# Patient Record
Sex: FEMALE | Race: AMERICAN INDIAN OR ALASKA NATIVE
[De-identification: names, ages, dates, MRNs, and addresses within clinical notes are randomized per-mention and may not be internally consistent; named-entity substitution may affect disease eponyms.]

---

## 2020-10-01 ENCOUNTER — HOSPITAL ENCOUNTER (EMERGENCY)
Dept: HOSPITAL 5 - ED | Age: 30
Discharge: HOME | End: 2020-10-01
Payer: SELF-PAY

## 2020-10-01 VITALS — SYSTOLIC BLOOD PRESSURE: 143 MMHG | DIASTOLIC BLOOD PRESSURE: 68 MMHG

## 2020-10-01 DIAGNOSIS — D25.9: Primary | ICD-10-CM

## 2020-10-01 DIAGNOSIS — Z79.899: ICD-10-CM

## 2020-10-01 DIAGNOSIS — R11.2: ICD-10-CM

## 2020-10-01 DIAGNOSIS — E11.9: ICD-10-CM

## 2020-10-01 DIAGNOSIS — N39.0: ICD-10-CM

## 2020-10-01 DIAGNOSIS — F17.200: ICD-10-CM

## 2020-10-01 LAB
ALBUMIN SERPL-MCNC: 3.7 G/DL (ref 3.9–5)
ALT SERPL-CCNC: 18 UNITS/L (ref 7–56)
BACTERIA #/AREA URNS HPF: (no result) /HPF
BASOPHILS # (AUTO): 0.1 K/MM3 (ref 0–0.1)
BASOPHILS NFR BLD AUTO: 0.5 % (ref 0–1.8)
BILIRUB UR QL STRIP: (no result)
BLOOD UR QL VISUAL: (no result)
BUN SERPL-MCNC: 7 MG/DL (ref 7–17)
BUN/CREAT SERPL: 10 %
CALCIUM SERPL-MCNC: 9.4 MG/DL (ref 8.4–10.2)
EOSINOPHIL # BLD AUTO: 0 K/MM3 (ref 0–0.4)
EOSINOPHIL NFR BLD AUTO: 0.2 % (ref 0–4.3)
HCT VFR BLD CALC: 34.3 % (ref 30.3–42.9)
HEMOLYSIS INDEX: 2
HGB BLD-MCNC: 10.9 GM/DL (ref 10.1–14.3)
LYMPHOCYTES # BLD AUTO: 1.3 K/MM3 (ref 1.2–5.4)
LYMPHOCYTES NFR BLD AUTO: 8.4 % (ref 13.4–35)
MCHC RBC AUTO-ENTMCNC: 32 % (ref 30–34)
MCV RBC AUTO: 76 FL (ref 79–97)
MONOCYTES # (AUTO): 1 K/MM3 (ref 0–0.8)
MONOCYTES % (AUTO): 6.1 % (ref 0–7.3)
MUCOUS THREADS #/AREA URNS HPF: (no result) /HPF
PH UR STRIP: 6 [PH] (ref 5–7)
PLATELET # BLD: 452 K/MM3 (ref 140–440)
RBC # BLD AUTO: 4.54 M/MM3 (ref 3.65–5.03)
RBC #/AREA URNS HPF: 5 /HPF (ref 0–6)
UROBILINOGEN UR-MCNC: < 2 MG/DL (ref ?–2)
WBC #/AREA URNS HPF: 104 /HPF (ref 0–6)

## 2020-10-01 PROCEDURE — 74177 CT ABD & PELVIS W/CONTRAST: CPT

## 2020-10-01 PROCEDURE — 81001 URINALYSIS AUTO W/SCOPE: CPT

## 2020-10-01 PROCEDURE — 85025 COMPLETE CBC W/AUTO DIFF WBC: CPT

## 2020-10-01 PROCEDURE — 96375 TX/PRO/DX INJ NEW DRUG ADDON: CPT

## 2020-10-01 PROCEDURE — 83690 ASSAY OF LIPASE: CPT

## 2020-10-01 PROCEDURE — 80053 COMPREHEN METABOLIC PANEL: CPT

## 2020-10-01 PROCEDURE — 76856 US EXAM PELVIC COMPLETE: CPT

## 2020-10-01 PROCEDURE — 36415 COLL VENOUS BLD VENIPUNCTURE: CPT

## 2020-10-01 PROCEDURE — 84703 CHORIONIC GONADOTROPIN ASSAY: CPT

## 2020-10-01 PROCEDURE — 82962 GLUCOSE BLOOD TEST: CPT

## 2020-10-01 PROCEDURE — 82805 BLOOD GASES W/O2 SATURATION: CPT

## 2020-10-01 PROCEDURE — 76830 TRANSVAGINAL US NON-OB: CPT

## 2020-10-01 PROCEDURE — 96374 THER/PROPH/DIAG INJ IV PUSH: CPT

## 2020-10-01 PROCEDURE — 99284 EMERGENCY DEPT VISIT MOD MDM: CPT

## 2020-10-01 PROCEDURE — 96361 HYDRATE IV INFUSION ADD-ON: CPT

## 2020-10-01 NOTE — ULTRASOUND REPORT
PELVIC ULTRASOUND



INDICATION: Right adnexal mass on CT, lower abdominal pain, nausea, vomiting



COMPARISON: CT abdomen and pelvis today



TECHNIQUE: Transabdominal and endovaginal



FINDINGS: Uterus measures 6.7 x 4.2 x 5.2 cm. Endometrial stripe measures 12 mm. No fluid is seen wit
hin the endometrial canal. No uterine abnormalities are noted.



Right ovary measures 3.5 cm in length and shows small follicular-type cysts. Left ovary measures 3.4 
cm in length and shows no focal abnormalities. Flow is noted in both ovaries.



Based minimal amount of free fluid is seen in the cul-de-sac which is a nonspecific finding.



IMPRESSION: No significant abnormalities are seen. The solid-appearing adnexal mass on the right on C
T is not detected. Clearly this is present by CT in given the normal appearance of the ovaries I susp
ect this is either a large endometrioma or possibly an unusually large pedunculated leiomyoma though 
given the size and shape this is not favored. Certainly I would at least suggest CT follow-up.



Signer Name: Bryant Smith MD 

Signed: 10/1/2020 8:16 PM

Workstation Name: [id number]

## 2020-10-01 NOTE — CAT SCAN REPORT
CT abdomen pelvis w con



INDICATION:

Lower abdominal pain nausea vomiting.



TECHNIQUE:

All CT scans at this location are performed using CT dose reduction for ALARA by means of automated e
xposure control. 



COMPARISON:

None available.



FINDINGS:

Lung bases are clear of acute disease. Liver, gallbladder, spleen, pancreas, kidneys and adrenals are
 negative. Abdominal aorta is normal in size. No adenopathy.



Pelvis



Normal appendix. The uterus is deviated to the left by what appears to be a large (8.2 cm x 5.9 cm x 
9.1 cm) right adnexal mass. Urinary bladder is negative although somewhat compressed by the mass. Lef
t ovary appears unremarkable.



No skeletal lesions.



IMPRESSION:

1. 9 cm maximum diameter solid appearing right adnexal mass. Suggest ultrasound as a first step in fu
rther evaluation. 



Signer Name: Buck Roe MD 

Signed: 10/1/2020 4:28 PM

Workstation Name: [id number]

## 2020-10-01 NOTE — EMERGENCY DEPARTMENT REPORT
HPI





- HPI


HPI: 





Room 6








The patient is a 29-year-old female present with a chief complaint of abdominal 

pain nausea vomiting.  Patient states she developed right-sided abdominal pain 3

days ago described as aching in nature.  Patient states the pain is been 

intermittent and does not change with meals.  Patient admits to nausea vomiting 

and body aches.  Patient states last week she had dysuria but none now.  Patient

denies diarrhea or cough or known contact with COVID patients.  Patient admits 

to subjective fever.





<UDAY ROWAN - Last Filed: 10/01/20 16:39>





<AARON MONROE III - Last Filed: 10/01/20 21:46>





- General


Chief Complaint: Hyperglycemia


Time Seen by Provider: 10/01/20 13:36





ED Past Medical Hx





- Past Medical History


Hx Diabetes: Yes





- Surgical History


Additional Surgical History: BL leg, L knee





- Family History


Family history: no significant





- Social History


Smoking Status: Current Every Day Smoker (1 pack/day)


Substance Use Type: None (Denies illicit drug use)





<UDAY ROWAN - Last Filed: 10/01/20 16:39>





<AARON MONROE III - Last Filed: 10/01/20 21:46>





- Medications


Home Medications: 


                                Home Medications











 Medication  Instructions  Recorded  Confirmed  Last Taken  Type


 


Ondansetron [Zofran Odt] 4 mg PO Q6HR PRN #20 tab.rapdis 10/01/20  Unknown Rx


 


levoFLOXacin [Levaquin TAB] 500 mg PO QDAY 10 Days #10 tablet 10/01/20  Unknown 

Rx














ED Review of Systems


ROS: 


Stated complaint: SUGAR HIGH


Other details as noted in HPI





Constitutional: fever (Subjective)


Respiratory: no symptoms reported


Endocrine: no symptoms reported


Gastrointestinal: abdominal pain, nausea, vomiting.  denies: diarrhea


Genitourinary: dysuria





<UDAY ROWAN - Last Filed: 10/01/20 16:39>


ROS: 


Stated complaint: SUGAR HIGH


Other details as noted in HPI








<AARON MONROE III - Last Filed: 10/01/20 21:46>





Physical Exam





- Physical Exam


Vital Signs: 


                                   Vital Signs











  10/01/20 10/01/20 10/01/20





  12:28 13:35 13:45


 


Temperature 99.6 F  


 


Pulse Rate 120 H 98 H 102 H


 


Respiratory 18 20 37 H





Rate   


 


Blood Pressure 143/87  135/76


 


Blood Pressure  131/97 





[Left]   


 


O2 Sat by Pulse 99 99 99





Oximetry   











Physical Exam: 





GENERAL: The patient is well-developed well-nourished female lying on stretcher 

sleeping not appearing to be in acute distress. []


HEENT: Normocephalic.  Atraumatic.  Extraocular motions are intact.  Patient has

 moist mucous membranes.


NECK: Supple.  Trachea midline


CHEST/LUNGS: Clear to auscultation.  There is no respiratory distress noted.


HEART/CARDIOVASCULAR: Regular.  There is no tachycardia.  There is no gallop rub

 or murmur.


ABDOMEN: Abdomen is soft, with tenderness to palpation in the suprapubic region.

  Patient has normal bowel sounds.  There is no abdominal distention.


SKIN: There is no rash.  There is no edema.  There is no diaphoresis.


NEURO: The patient is awake, alert, and oriented.  The patient is cooperative. 

The patient has normal speech


MUSCULOSKELETAL: There is no CVA tenderness.  There is no evidence of acute 

injury.





<UDAY ROWAN K - Last Filed: 10/01/20 16:39>





- Physical Exam


Vital Signs: 


                                   Vital Signs











  10/01/20 10/01/20 10/01/20





  12:28 13:35 13:45


 


Temperature 99.6 F  


 


Pulse Rate 120 H 98 H 102 H


 


Respiratory 18 20 37 H





Rate   


 


Blood Pressure 143/87  135/76


 


Blood Pressure  131/97 





[Left]   


 


O2 Sat by Pulse 99 99 99





Oximetry   














  10/01/20 10/01/20





  14:45 15:31


 


Temperature  


 


Pulse Rate 98 H 88


 


Respiratory 33 H 24





Rate  


 


Blood Pressure 123/62 123/62


 


Blood Pressure  





[Left]  


 


O2 Sat by Pulse 100 99





Oximetry  














<AARON MONROE III K - Last Filed: 10/01/20 21:46>





ED Course


                                   Vital Signs











  10/01/20 10/01/20 10/01/20





  12:28 13:35 13:45


 


Temperature 99.6 F  


 


Pulse Rate 120 H 98 H 102 H


 


Respiratory 18 20 37 H





Rate   


 


Blood Pressure 143/87  135/76


 


Blood Pressure  131/97 





[Left]   


 


O2 Sat by Pulse 99 99 99





Oximetry   














<UDAY ROWAN K - Last Filed: 10/01/20 16:39>


                                   Vital Signs











  10/01/20 10/01/20 10/01/20





  12:28 13:35 13:45


 


Temperature 99.6 F  


 


Pulse Rate 120 H 98 H 102 H


 


Respiratory 18 20 37 H





Rate   


 


Blood Pressure 143/87  135/76


 


Blood Pressure  131/97 





[Left]   


 


O2 Sat by Pulse 99 99 99





Oximetry   














  10/01/20 10/01/20





  14:45 15:31


 


Temperature  


 


Pulse Rate 98 H 88


 


Respiratory 33 H 24





Rate  


 


Blood Pressure 123/62 123/62


 


Blood Pressure  





[Left]  


 


O2 Sat by Pulse 100 99





Oximetry  














- Reevaluation(s)


Reevaluation #1: 


Patient was signed out to me from previous physician at 8 PM.





Patient states that her pain is resolved.  Patient states her nausea has 

resolved.





I discussed all results and clinical findings with patient.  I discussed plan of

 care with patient.  Patient agrees with plan of care.  Patient is stable for 

discharge.  Patient will be discharged home.  Patient given discharge 

instructions.  Patient voiced understanding of discharge instructions.


10/01/20 21:41








<AARON MONROE III - Last Filed: 10/01/20 21:46>





ED Medical Decision Making





- Lab Data


Result diagrams: 


                                 10/01/20 12:39





                                 10/01/20 12:39





                                Laboratory Tests











  10/01/20 10/01/20 10/01/20





  12:39 12:39 12:39


 


WBC  15.9 H  


 


RBC  4.54  


 


Hgb  10.9  


 


Hct  34.3  


 


MCV  76 L  


 


MCH  24 L  


 


MCHC  32  


 


RDW  15.6 H  


 


Plt Count  452 H  


 


Lymph % (Auto)  8.4 L  


 


Mono % (Auto)  6.1  


 


Eos % (Auto)  0.2  


 


Baso % (Auto)  0.5  


 


Lymph # (Auto)  1.3  


 


Mono # (Auto)  1.0 H  


 


Eos # (Auto)  0.0  


 


Baso # (Auto)  0.1  


 


Seg Neutrophils %  84.8 H  


 


Seg Neutrophils #  13.5 H  


 


VBG pH   


 


Sodium   132 L 


 


Potassium   3.8 


 


Chloride   93.9 L 


 


Carbon Dioxide   25 


 


Anion Gap   17 


 


BUN   7 


 


Creatinine   0.7 


 


Estimated GFR   > 60 


 


BUN/Creatinine Ratio   10 


 


Glucose   323 H 


 


POC Glucose   


 


Calcium   9.4 


 


Total Bilirubin   0.30 


 


AST   15 


 


ALT   18 


 


Alkaline Phosphatase   118 


 


Total Protein   8.0 


 


Albumin   3.7 L 


 


Albumin/Globulin Ratio   0.9 


 


Lipase   28 


 


HCG, Qual    Negative


 


Urine Color   


 


Urine Turbidity   


 


Urine pH   


 


Ur Specific Gravity   


 


Urine Protein   


 


Urine Glucose (UA)   


 


Urine Ketones   


 


Urine Blood   


 


Urine Nitrite   


 


Urine Bilirubin   


 


Urine Urobilinogen   


 


Ur Leukocyte Esterase   


 


Urine WBC (Auto)   


 


Urine RBC (Auto)   


 


U Epithel Cells (Auto)   


 


Urine Bacteria (Auto)   


 


Urine Mucus   














  10/01/20 10/01/20 10/01/20





  12:42 12:46 15:47


 


WBC   


 


RBC   


 


Hgb   


 


Hct   


 


MCV   


 


MCH   


 


MCHC   


 


RDW   


 


Plt Count   


 


Lymph % (Auto)   


 


Mono % (Auto)   


 


Eos % (Auto)   


 


Baso % (Auto)   


 


Lymph # (Auto)   


 


Mono # (Auto)   


 


Eos # (Auto)   


 


Baso # (Auto)   


 


Seg Neutrophils %   


 


Seg Neutrophils #   


 


VBG pH   7.337 


 


Sodium   


 


Potassium   


 


Chloride   


 


Carbon Dioxide   


 


Anion Gap   


 


BUN   


 


Creatinine   


 


Estimated GFR   


 


BUN/Creatinine Ratio   


 


Glucose   


 


POC Glucose  302 H  


 


Calcium   


 


Total Bilirubin   


 


AST   


 


ALT   


 


Alkaline Phosphatase   


 


Total Protein   


 


Albumin   


 


Albumin/Globulin Ratio   


 


Lipase   


 


HCG, Qual   


 


Urine Color    Straw


 


Urine Turbidity    Clear


 


Urine pH    6.0


 


Ur Specific Gravity    1.051 H


 


Urine Protein    30 mg/dl


 


Urine Glucose (UA)    >=500


 


Urine Ketones    20


 


Urine Blood    Neg


 


Urine Nitrite    Neg


 


Urine Bilirubin    Neg


 


Urine Urobilinogen    < 2.0


 


Ur Leukocyte Esterase    Lg


 


Urine WBC (Auto)    104.0 H


 


Urine RBC (Auto)    5.0


 


U Epithel Cells (Auto)    3.0


 


Urine Bacteria (Auto)    1+


 


Urine Mucus    Few














- Radiology Data


Radiology results: report reviewed (CT abdomen pelvis), image reviewed (CT 

abdomen pelvis)





[facility] [address] [place] [address] Cat

 Scan Report Signed Patient: VANESSA GARLAND MR#: [id number] : 1990 

Acct:[id number] Age/Sex: 29 / F ADM Date: 10/01/20 Loc: ED Attending Dr: 

Ordering Physician: UDAY ROWAN MD Date of Service: 10/01/20 Procedure(s): CT 

abdomen pelvis w con Accession Number(s): [id number] cc: UDAY ROWAN MD CT abdomen

 pelvis w con INDICATION: Lower abdominal pain nausea vomiting. TECHNIQUE: All 

CT scans at this location are performed using CT dose reduction for ALARA by 

means of automated exposure control. COMPARISON: None available. FINDINGS: Lung 

bases are clear of acute disease. Liver, gallbladder, spleen, pancreas, kidneys 

and adrenals are negative. Abdominal aorta is normal in size. No adenopathy. 

Pelvis Normal appendix. The uterus is deviated to the left by what appears to be

 a large (8.2 cm x 5.9 cm x 9.1 cm) right adnexal mass. Urinary bladder is 

negative although somewhat compressed by the mass. Left ovary appears 

unremarkable. No skeletal lesions. IMPRESSION: 1. 9 cm maximum diameter solid 

appearing right adnexal mass. Suggest ultrasound as a first step in further 

evaluation. Signer Name: Buck Roe MD Signed: 10/1/2020 4:28 PM Workstation 

Name: [id number] Transcribed By: TM Dictated By: Buck Roe MD 

Electronically Authenticated By: Buck Roe MD Signed Date/Time: 10/01/20 

1628 DD/DT: 10/01/20 1623 TD/TT: 





<UDAY ROWAN - Last Filed: 10/01/20 16:39>





- Lab Data


Result diagrams: 


                                 10/01/20 12:39





                                 10/01/20 12:39





<AARON MONROE III - Last Filed: 10/01/20 21:46>


Critical care attestation.: 


If time is entered above; I have spent that time in minutes in the direct care 

of this critically ill patient, excluding procedure time.








<UDAY ROWAN - Last Filed: 10/01/20 16:39>


Critical care attestation.: 


If time is entered above; I have spent that time in minutes in the direct care 

of this critically ill patient, excluding procedure time.








<AARON MONROE III - Last Filed: 10/01/20 21:46>





ED Disposition





<UDAY ROWAN - Last Filed: 10/01/20 16:39>


Is pt being admited?: No


Does the pt Need Aspirin: No


Time of Disposition: 21:46





<AARON MONROE III - Last Filed: 10/01/20 21:46>


Clinical Impression: 


Abdominal pain


Qualifiers:


 Abdominal location: lower abdomen, unspecified Qualified Code(s): R10.30 - 

Lower abdominal pain, unspecified





Uterine fibroid


Qualifiers:


 Uterine leiomyoma location: unspecified location Qualified Code(s): D25.9 - 

Leiomyoma of uterus, unspecified





Nausea & vomiting


Qualifiers:


 Vomiting type: unspecified Vomiting Intractability: non-intractable Qualified 

Code(s): R11.2 - Nausea with vomiting, unspecified





UTI (urinary tract infection)


Qualifiers:


 Urinary tract infection type: acute cystitis Hematuria presence: with hematuria

 Qualified Code(s): N30.01 - Acute cystitis with hematuria





Disposition:  TO HOME OR SELFCARE


Condition: Stable


Instructions:  Uterine Fibroids (ED), Urinary Tract Infection in Women (ED)


Additional Instructions: 


Patient to follow-up with primary care in 2 to 3 days.  Patient to follow-up 

with OB/GYN in 2 to 3 days.  Patient to rest.  Patient to increase water.  

Patient to avoid strenuous exercise or heavy lifting until cleared by OB/GYN.  

Patient to take Tylenol or ibuprofen as needed for pain.  Patient to take meds 

as directed.  Patient to return to the ER if condition worsens, changes or new 

symptoms arise.


Prescriptions: 


levoFLOXacin [Levaquin TAB] 500 mg PO QDAY 10 Days #10 tablet


Ondansetron [Zofran Odt] 4 mg PO Q6HR PRN #20 tab.rapdis


 PRN Reason: Nausea And Vomiting


Referrals: 


PRIMARY CARE,MD [Primary Care Provider] - 2-3 Days


IMER FERMIN MD [Staff Physician] - 2-3 Days